# Patient Record
Sex: FEMALE | Race: WHITE | NOT HISPANIC OR LATINO
[De-identification: names, ages, dates, MRNs, and addresses within clinical notes are randomized per-mention and may not be internally consistent; named-entity substitution may affect disease eponyms.]

---

## 2017-08-28 ENCOUNTER — APPOINTMENT (OUTPATIENT)
Dept: PSYCHIATRY | Facility: CLINIC | Age: 47
End: 2017-08-28

## 2018-12-05 ENCOUNTER — APPOINTMENT (OUTPATIENT)
Dept: RHEUMATOLOGY | Facility: CLINIC | Age: 48
End: 2018-12-05

## 2019-10-10 ENCOUNTER — RX RENEWAL (OUTPATIENT)
Age: 49
End: 2019-10-10

## 2019-12-27 ENCOUNTER — RX RENEWAL (OUTPATIENT)
Age: 49
End: 2019-12-27

## 2020-10-16 RX ORDER — DULOXETINE HYDROCHLORIDE 60 MG/1
60 CAPSULE, DELAYED RELEASE PELLETS ORAL
Qty: 90 | Refills: 0 | Status: ACTIVE | COMMUNITY
Start: 2019-10-10 | End: 1900-01-01

## 2020-10-19 RX ORDER — DULOXETINE HYDROCHLORIDE 30 MG/1
30 CAPSULE, DELAYED RELEASE PELLETS ORAL
Qty: 90 | Refills: 0 | Status: ACTIVE | COMMUNITY
Start: 2019-12-27 | End: 1900-01-01

## 2020-10-26 ENCOUNTER — EMERGENCY (EMERGENCY)
Facility: HOSPITAL | Age: 50
LOS: 0 days | Discharge: HOME | End: 2020-10-26
Payer: COMMERCIAL

## 2020-10-26 VITALS
DIASTOLIC BLOOD PRESSURE: 47 MMHG | TEMPERATURE: 98 F | OXYGEN SATURATION: 100 % | SYSTOLIC BLOOD PRESSURE: 93 MMHG | RESPIRATION RATE: 15 BRPM | HEART RATE: 73 BPM

## 2020-10-26 DIAGNOSIS — Z20.828 CONTACT WITH AND (SUSPECTED) EXPOSURE TO OTHER VIRAL COMMUNICABLE DISEASES: ICD-10-CM

## 2020-10-26 PROCEDURE — U0003: CPT

## 2020-10-26 PROCEDURE — 99283 EMERGENCY DEPT VISIT LOW MDM: CPT

## 2020-10-26 NOTE — ED STATDOCS - PHYSICAL EXAMINATION
Constitutional: NAD AAOx3. Nontoxic, well appearing. Speaking full sentences  w/o distress  Eyes: EOMI, pupils equal  Head: Normocephalic atraumatic  Mouth: no airway obstruction  Cardiac: [id number]   Resp: Lungs CTAB  GI: Abd s/nt/nd  Neuro: motor and sensory intact

## 2020-10-26 NOTE — ED STATDOCS - PATIENT PORTAL LINK FT
You can access the FollowMyHealth Patient Portal offered by [facility] by registering at the following website: [email or web address]. By joining [employer]’s FollowMyHealth portal, you will also be able to view your health information using other applications (apps) compatible with our system.

## 2020-10-27 LAB — SARS-COV-2 RNA SPEC QL NAA+PROBE: SIGNIFICANT CHANGE UP

## 2020-11-05 ENCOUNTER — APPOINTMENT (OUTPATIENT)
Dept: INTERNAL MEDICINE | Facility: CLINIC | Age: 50
End: 2020-11-05

## 2020-12-10 ENCOUNTER — APPOINTMENT (OUTPATIENT)
Dept: ULTRASOUND IMAGING | Facility: HOSPITAL | Age: 50
End: 2020-12-10
Payer: COMMERCIAL

## 2020-12-10 ENCOUNTER — APPOINTMENT (OUTPATIENT)
Dept: MAMMOGRAPHY | Facility: HOSPITAL | Age: 50
End: 2020-12-10
Payer: COMMERCIAL

## 2020-12-10 ENCOUNTER — OUTPATIENT (OUTPATIENT)
Dept: OUTPATIENT SERVICES | Facility: HOSPITAL | Age: 50
LOS: 1 days | End: 2020-12-10
Payer: COMMERCIAL

## 2020-12-10 DIAGNOSIS — Z00.8 ENCOUNTER FOR OTHER GENERAL EXAMINATION: ICD-10-CM

## 2020-12-10 PROBLEM — Z78.9 OTHER SPECIFIED HEALTH STATUS: Chronic | Status: ACTIVE | Noted: 2020-10-26

## 2020-12-10 PROCEDURE — 76641 ULTRASOUND BREAST COMPLETE: CPT | Mod: 26,50

## 2020-12-10 PROCEDURE — 77063 BREAST TOMOSYNTHESIS BI: CPT | Mod: 26

## 2020-12-10 PROCEDURE — 77067 SCR MAMMO BI INCL CAD: CPT

## 2020-12-10 PROCEDURE — 76641 ULTRASOUND BREAST COMPLETE: CPT

## 2020-12-10 PROCEDURE — 77067 SCR MAMMO BI INCL CAD: CPT | Mod: 26

## 2020-12-10 PROCEDURE — 77063 BREAST TOMOSYNTHESIS BI: CPT

## 2022-03-01 ENCOUNTER — RESULT REVIEW (OUTPATIENT)
Age: 52
End: 2022-03-01

## 2022-03-02 ENCOUNTER — RESULT REVIEW (OUTPATIENT)
Age: 52
End: 2022-03-02

## 2024-04-15 ENCOUNTER — APPOINTMENT (OUTPATIENT)
Dept: ORTHOPEDIC SURGERY | Facility: CLINIC | Age: 54
End: 2024-04-15

## 2024-04-16 ENCOUNTER — APPOINTMENT (OUTPATIENT)
Dept: ORTHOPEDIC SURGERY | Facility: CLINIC | Age: 54
End: 2024-04-16
Payer: COMMERCIAL

## 2024-04-16 DIAGNOSIS — M75.41 IMPINGEMENT SYNDROME OF RIGHT SHOULDER: ICD-10-CM

## 2024-04-16 DIAGNOSIS — M23.92 UNSPECIFIED INTERNAL DERANGEMENT OF LEFT KNEE: ICD-10-CM

## 2024-04-16 DIAGNOSIS — M75.51 BURSITIS OF RIGHT SHOULDER: ICD-10-CM

## 2024-04-16 DIAGNOSIS — Z78.9 OTHER SPECIFIED HEALTH STATUS: ICD-10-CM

## 2024-04-16 DIAGNOSIS — F17.200 NICOTINE DEPENDENCE, UNSPECIFIED, UNCOMPLICATED: ICD-10-CM

## 2024-04-16 DIAGNOSIS — M23.91 UNSPECIFIED INTERNAL DERANGEMENT OF RIGHT KNEE: ICD-10-CM

## 2024-04-16 PROCEDURE — 73030 X-RAY EXAM OF SHOULDER: CPT | Mod: RT

## 2024-04-16 PROCEDURE — 73564 X-RAY EXAM KNEE 4 OR MORE: CPT | Mod: 50

## 2024-04-16 PROCEDURE — 99204 OFFICE O/P NEW MOD 45 MIN: CPT | Mod: 25

## 2024-04-16 NOTE — HISTORY OF PRESENT ILLNESS
[Sudden] : sudden [9] : 9 [0] : 0 [Dull/Aching] : dull/aching [Throbbing] : throbbing [de-identified] : 04/16/2024: This is a 53-year-old female, RHD shoulder and  B/L knee pain.  Patient reports tripping and falling over an electrical wire in October. She has used Advil no relief for the shoulder and some relief for her knees. There are night symptoms. Reaching can be painful. She reports buckling and cracking on her right knee. Pain R>L knee.  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

## 2024-04-16 NOTE — DISCUSSION/SUMMARY
[de-identified] : The patient was advised of the diagnosis. The natural history of the pathology was explained in full to the patient in layman's terms. The risks and benefits of surgical and non-surgical treatment alternatives were explained in full to the patient. All questions were answered.

## 2024-04-16 NOTE — ASSESSMENT
[FreeTextEntry1] : Underlying pathology reviewed and treatment options discussed. 04/16/2024: x-rays, views, reveal unremarkable . Obtain MRI of right shoulder and B/L knees R/O tear.  Activity modification as tolerated. Continue taking Advil for the pain.  Questions addressed. Follow up after MRI results.  The documentation recorded by the scribe accurately reflects the service I personally performed and the decisions made by me. I, Veronica Rowan, attest that this documentation has been prepared under the direction and in the presence of Provider Sukhi Hendrix MD.  The patient was seen by Sukhi Hendrix MD.

## 2024-04-16 NOTE — PHYSICAL EXAM
[Severe] : severe [Right] : right knee [Equivocal] : equivocal Last [Left] : left knee [NL (0)] : extension 0 degrees [5___] : hamstring 5[unfilled]/5 [] : no erythema [TWNoteComboBox7] : flexion 130 degrees

## 2024-04-17 ENCOUNTER — APPOINTMENT (OUTPATIENT)
Dept: MRI IMAGING | Facility: CLINIC | Age: 54
End: 2024-04-17
Payer: COMMERCIAL

## 2024-04-17 PROCEDURE — 73221 MRI JOINT UPR EXTREM W/O DYE: CPT | Mod: RT

## 2024-04-18 ENCOUNTER — APPOINTMENT (OUTPATIENT)
Dept: MRI IMAGING | Facility: CLINIC | Age: 54
End: 2024-04-18
Payer: COMMERCIAL

## 2024-04-18 PROCEDURE — 73721 MRI JNT OF LWR EXTRE W/O DYE: CPT | Mod: LT

## 2024-04-18 PROCEDURE — 73721 MRI JNT OF LWR EXTRE W/O DYE: CPT | Mod: RT

## 2024-05-02 ENCOUNTER — APPOINTMENT (OUTPATIENT)
Dept: ORTHOPEDIC SURGERY | Facility: CLINIC | Age: 54
End: 2024-05-02
Payer: COMMERCIAL

## 2024-05-02 DIAGNOSIS — M67.911 UNSPECIFIED DISORDER OF SYNOVIUM AND TENDON, RIGHT SHOULDER: ICD-10-CM

## 2024-05-02 DIAGNOSIS — S83.91XD SPRAIN OF UNSPECIFIED SITE OF RIGHT KNEE, SUBSEQUENT ENCOUNTER: ICD-10-CM

## 2024-05-02 DIAGNOSIS — M75.00 ADHESIVE CAPSULITIS OF UNSPECIFIED SHOULDER: ICD-10-CM

## 2024-05-02 DIAGNOSIS — S43.431D SUPERIOR GLENOID LABRUM LESION OF RIGHT SHOULDER, SUBSEQUENT ENCOUNTER: ICD-10-CM

## 2024-05-02 DIAGNOSIS — M65.9 SYNOVITIS AND TENOSYNOVITIS, UNSPECIFIED: ICD-10-CM

## 2024-05-02 PROCEDURE — 99213 OFFICE O/P EST LOW 20 MIN: CPT

## 2024-05-02 NOTE — PHYSICAL EXAM
[Severe] : severe [Right] : right knee [Equivocal] : equivocal Last [Left] : left knee [NL (0)] : extension 0 degrees [5___] : hamstring 5[unfilled]/5 [FreeTextEntry9] : IR to side. [] : no erythema [TWNoteComboBox7] : flexion 130 degrees

## 2024-05-02 NOTE — DATA REVIEWED
[I independently reviewed and interpreted images and report] : I independently reviewed and interpreted images and report [Left] : left [Knee] : knee [MRI] : MRI [Right] : of the right [Shoulder] : shoulder [Report was reviewed and noted in the chart] : The report was reviewed and noted in the chart [FreeTextEntry1] : OC MRI RT KNEE 04/18/2024: 1. Slight patellofemoral effusion, synovitis, slight popliteal cyst and mild pes anserine tendinitis posteromedial. 2. Slight MCL sprain with slight surrounding soft tissue swelling proximally. 3. Mild ACL sprain with surrounding synovitis. 4. Mild extensor mechanism tendinopathy.  5.No meniscal tear, acute osseous injury or loose body.   OC MRI LT KNEE 04/18/2024: 1. Slight patellofemoral effusion, synovitis, tiny popliteal cyst, and slight pes anserine tenosynovitis. 2. Slight proximal MCL sprain.  3. Mild ACL sprain with surrounding synovitis. 4. No meniscal tear, chondral defect, marrow edema, or loose body.   OC MRI RIGHT SHOULDER 04/7/24: 1. Tearing of the superior, anterior, and inferior labrum with sling effusion and mild capsulitis. 2. Supraspinatus and infraspinatus tendinopathy without tear and slight subacromial bursitis with AC joint arthrosis, lateral acromial bone spurs,mild biceps tenosynovitis, an dsmall subcortical cysts in the posterior greater tuberosity. Patient motion signficantly degrades image quality on multiple imaging sequences.

## 2024-05-02 NOTE — HISTORY OF PRESENT ILLNESS
"History & Physical    I, Enrico Rivas, attest that this documentation has been prepared under the direction and in the presence of Michael Barreto MD.    06/08/2018    Chief Complaint   Patient presents with    Consult       History of Present Illness:  Hailey Sawant is a 73 y.o. patient referred to me by Dr. Sayda Lopez MD for evaluation of back pain.     Patient reports her pain as stabbing back pain, right-sided, associated with right leg pain in a non-dermatomal distribution (over and around the thigh), and mechanical in origin. Patient describes the pain as sharp, stabbing pain that gets worse with standing, bending over, and laying down. Pain gets better with sitting up and with heating pads. The pain has been there for 1.5 years, and is worse on her back than her leg (back pain 9/10, right leg pain 8/10). Patient denies any bladder and bowel dysfunction and reports no treatment with physical therapy, injections, or narcotics.     Review of patient's allergies indicates:  No Known Allergies    Current Outpatient Prescriptions   Medication Sig Dispense Refill    acetaminophen 500 mg/15 mL Liqd       alcohol antiseptic pads (ALCOHOL PREP PADS TOP)       aspirin 81 MG Chew Take 81 mg by mouth once daily.      aspirin 81 MG Chew       BD LUER-GENTRY SYRINGE 3 mL 18 x 1 1/2" Syrg       BD REGULAR BEVEL NEEDLES 25 gauge x 1 1/2" Ndle USE AS DIRECTED 100 each 0    bisacodyl (DULCOLAX) 10 mg Supp Place 1 suppository (10 mg total) rectally once daily.  0    cholecalciferol, vitamin D3, (VITAMIN D3) 5,000 unit Tab Take 5,000 Units by mouth once daily.      furosemide (LASIX) 40 MG tablet Take 20 mg by mouth. Monday and Friday  6    hydrocortisone (CORTEF) 20 MG Tab TAKE 1 TABLET EVERY MORNING  AND TAKE 1/2 TABLET EVERY EVENING  AT  5PM 135 tablet 6    losartan (COZAAR) 50 MG tablet Take 50 mg by mouth once daily.      metFORMIN (GLUCOPHAGE-XR) 500 MG 24 hr tablet TAKE 1 TABLET TWICE DAILY WITH MEALS " 180 tablet 3    mometasone 0.1% (ELOCON) 0.1 % cream       NIFEdipine (PROCARDIA-XL) 90 MG (OSM) 24 hr tablet       oxycodone (ROXICODONE) 5 MG immediate release tablet Take 1 tablet (5 mg total) by mouth every 4 (four) hours as needed. 61 tablet 0    TRUE METRIX AIR GLUCOSE METER kit CHECK BLOOD SUGAR ONE TIME DAILY 1 each 0    TRUE METRIX GLUCOSE TEST STRIP Strp CHECK BLOOD SUGAR ONE TIME DAILY 100 strip 3    TRUEPLUS LANCETS 28 gauge Misc TEST ONE TIME DAILY 200 each 3    celecoxib (CELEBREX) 200 MG capsule Take 1 capsule (200 mg total) by mouth 2 (two) times daily. 28 capsule 1     No current facility-administered medications for this visit.        Past Medical History:   Diagnosis Date    Diabetes mellitus     Hypertension     Lumbar spondylosis 6/8/2018    Malignant pheochromocytoma     Pheochromocytoma     Pheochromocytoma, malignant 01/10/2016    Pheochromocytoma, malignant     Thyroid disease        Past Surgical History:   Procedure Laterality Date    ADRENALECTOMY      APPENDECTOMY      MIBG Therapy  3/2016, 7/2016, 12/2016    [facility] - Dr. Martines       Family History   Problem Relation Age of Onset    Heart disease Mother     Diabetes Mother     Heart disease Father     Melanoma Neg Hx        Social History   Substance Use Topics    Smoking status: Never Smoker    Smokeless tobacco: Never Used    Alcohol use No        Review of Systems:  Review of Systems   Constitutional: Negative for activity change.   HENT: Negative for congestion.    Eyes: Negative for discharge.   Respiratory: Negative for apnea.    Cardiovascular: Negative for chest pain.   Gastrointestinal: Negative for abdominal distention.   Endocrine: Negative for cold intolerance.   Genitourinary: Negative for difficulty urinating.   Musculoskeletal: Positive for back pain and myalgias (right leg pain). Negative for arthralgias.   Neurological: Negative for dizziness, weakness and headaches.   Psychiatric/Behavioral:  Negative for agitation.       Vital Signs (Most Recent)  Temp: 98 °F (36.7 °C) (06/07/18 1157)  Pulse: 71 (06/07/18 1157)  BP: 129/69 (06/07/18 1157)     90.2 kg (198 lb 14.4 oz)       Physical Exam:  Physical Exam:    Constitutional: She appears well-developed.     Eyes: Pupils are equal, round, and reactive to light. EOM are normal. Right eye exhibits no discharge. Left eye exhibits no discharge.     Abdominal: Soft.     Skin: Skin displays no rash on trunk and no rash on extremities.     Psych/Behavior: She is alert. She is oriented to person, place, and time.     Musculoskeletal:        Neck: There is no tenderness.        Back: Range of motion is full.        Right Upper Extremities: Range of motion is full. Muscle strength is 5/5. Tone is normal.        Left Upper Extremities: Range of motion is full. Muscle strength is 5/5. Tone is normal.       Right Lower Extremities: Range of motion is full. Muscle strength is 5/5. Tone is normal.        Left Lower Extremities: Range of motion is full. Muscle strength is 5/5. Tone is normal.     Neurological:        Coordination: She has a normal Romberg Test and normal tandem walking coordination.        Sensory: There is no sensory deficit in the trunk. There is no sensory deficit in the extremities.        DTRs: DTRs are DTRS NORMAL AND SYMMETRICnormal and symmetric. Tricep reflexes are 2+ on the right side and 2+ on the left side. Bicep reflexes are 2+ on the right side and 2+ on the left side. Brachioradialis reflexes are 2+ on the right side and 2+ on the left side. Patellar reflexes are 2+ on the right side and 2+ on the left side. Achilles reflexes are 2+ on the right side and 2+ on the left side. She displays no Babinski's sign on the right side. She displays no Babinski's sign on the left side.        Cranial nerves: Cranial nerve(s) II, III, IV, V, VI, VII, VIII, IX, X, XI and XII are intact.     5/5 strength throughout.   Positive right SI joint pain.    Positive Hernandez's test on R side.   Negative BL SLRT.       Laboratory  CBC: Reviewed  CMP: Reviewed    Diagnostic Results:  MRI: Reviewed   MRI Lumbar Spine W WO Contrast, dated 5/29/2018: Reviewed personally and explained the images to the patient and her sister.   Multilevel lumbar spondylosis with suspected prominent Modic type 1 changes involving the inferior endplate of L3 and superior endplate of L4.    No convincing evidence of osteomyelitis or discitis although early spondylo discitis may appear similarly.  * As early vertebral spondylo discitis may not be initially apparent on MRI, correlation with ESR and CRP recommended is a minimum (with additional consideration for repeat MRI with contrast within 2-3 weeks, particularly if pain persists) to exclude spondylo discitis.    Additional details as above.    This report was flagged in Epic as abnormal.    ASSESSMENT/PLAN:       ICD-10-CM ICD-9-CM   1. Lumbar spondylosis M47.816 721.3   2. Bilateral low back pain without sciatica, unspecified chronicity M54.5 724.2   3. Intervertebral disk disease M51.9 722.90   4. Hypertension due to endocrine disorder I15.2 405.99     259.9       PLAN:    1. Lumbar spondylosis with mechanical back pain, mild disc bulge at L3-4 with Modic changes at this level and mild foraminal stenosis without radiculopathy symptoms.  At this point, I do not recommend any surgical intervention. Rather, we will start aggressive conservative management. I will start her on Celebrex for 2 weeks, I will refer her to physical therapy / pool therapy for core muscle strengthening. I will also get dynamic x-rays of the lumbar spine to make sure there is no dynamic instability (given the Modic changes at L3-4).     2. Right SI joint pain. Will refer her for SI joint injections once she starts with the physical therapy and only if she does not have improvement with physical therapy.     3. I spent 35 minutes with the patient, 50% of time in  counseling.     4. FU with PCP for BP control.     5. I spent 35 minutes with the patient, 50% of time in counselingabout the above mentioned issues.        Hailey was seen today for consult.    Diagnoses and all orders for this visit:    Lumbar spondylosis    Bilateral low back pain without sciatica, unspecified chronicity    Intervertebral disk disease    Hypertension due to endocrine disorder      I, Dr. Michael Barreto, personally performed the services described in this documentation. All medical record entries made by the scribe were at my direction and in my presence.  I have reviewed the chart and agree that the record reflects my personal performance and is accurate and complete. Michael Barreto MD.  9:37 AM 06/08/2018]       [Sudden] : sudden [9] : 9 [0] : 0 [Dull/Aching] : dull/aching [Throbbing] : throbbing [de-identified] : 05/02/2024: Here to review MRI.  04/16/2024: This is a 53-year-old female, RHD shoulder and  B/L knee pain.  Patient reports tripping and falling over an electrical wire in October. She has used Advil no relief for the shoulder and some relief for her knees. There are night symptoms. Reaching can be painful. She reports buckling and cracking on her right knee. Pain R>L knee.  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

## 2024-05-02 NOTE — DISCUSSION/SUMMARY
[de-identified] : The patient was advised of the diagnosis. The natural history of the pathology was explained in full to the patient in layman's terms. The risks and benefits of surgical and non-surgical treatment alternatives were explained in full to the patient. All questions were answered.

## 2024-05-02 NOTE — ASSESSMENT
[FreeTextEntry1] : Underlying pathology reviewed and treatment options discussed. 04/16/2024: x-rays, views, reveal unremarkable. Obtain MRI of right shoulder and B/L knees R/O tear.  Activity modification as tolerated. Continue taking Advil for the pain.  Questions addressed. Follow up after MRI results.  05/02/2024: MRI findings reviewed and discussed which showed inflammation on her shoulder- otherwise normal. RT knee MRI showed inflammation, otherwise normal. LT knee MRI showed inflammation, otherwise normal. She is developing frozen shoulder.  F/up with rheumatology.  Prescribed MDP.  Start PT and HEP to improve mechanics and reduce pain.  The documentation recorded by the scribe accurately reflects the service I personally performed and the decisions made by me. I, Veronica Rowan, attest that this documentation has been prepared under the direction and in the presence of Provider Sukhi Hendrix MD.  The patient was seen by Sukhi Hendrix MD.

## 2024-07-29 ENCOUNTER — APPOINTMENT (OUTPATIENT)
Dept: ORTHOPEDIC SURGERY | Facility: CLINIC | Age: 54
End: 2024-07-29